# Patient Record
Sex: FEMALE | Race: WHITE | NOT HISPANIC OR LATINO | Employment: FULL TIME | URBAN - METROPOLITAN AREA
[De-identification: names, ages, dates, MRNs, and addresses within clinical notes are randomized per-mention and may not be internally consistent; named-entity substitution may affect disease eponyms.]

---

## 2024-04-05 ENCOUNTER — TELEPHONE (OUTPATIENT)
Dept: PRIMARY CARE | Facility: CLINIC | Age: 49
End: 2024-04-05
Payer: COMMERCIAL

## 2024-04-05 NOTE — TELEPHONE ENCOUNTER
It shouldn't be an issue for Dr. Mcdonough to complete it however it won't be done until this coming Monday

## 2024-04-05 NOTE — TELEPHONE ENCOUNTER
LV 6/14/22, 4/15/24    Pt needs a note stating that she can return to work on Tuesday, started covid symptoms on Monday, tested positive with covid Wednesday morning.  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

## 2024-04-08 PROBLEM — E78.00 PURE HYPERCHOLESTEROLEMIA: Status: ACTIVE | Noted: 2024-04-08

## 2024-04-08 PROBLEM — R73.01 IMPAIRED FASTING GLUCOSE: Status: ACTIVE | Noted: 2024-04-08

## 2024-04-08 PROBLEM — J30.2 SEASONAL ALLERGIC RHINITIS: Status: ACTIVE | Noted: 2024-04-08

## 2024-04-08 PROBLEM — L30.9 ECZEMA: Status: ACTIVE | Noted: 2024-04-08

## 2024-04-08 PROBLEM — G47.33 OBSTRUCTIVE SLEEP APNEA SYNDROME: Status: ACTIVE | Noted: 2024-04-08

## 2024-04-08 PROBLEM — F41.9 ANXIETY: Status: ACTIVE | Noted: 2024-04-08

## 2024-04-08 PROBLEM — J45.909 ASTHMA (HHS-HCC): Status: ACTIVE | Noted: 2024-04-08

## 2024-04-08 RX ORDER — LEVOCETIRIZINE DIHYDROCHLORIDE 5 MG/1
TABLET, FILM COATED ORAL EVERY 24 HOURS
COMMUNITY

## 2024-04-08 RX ORDER — FLUTICASONE PROPIONATE 50 MCG
1 SPRAY, SUSPENSION (ML) NASAL AS NEEDED
COMMUNITY

## 2024-04-08 RX ORDER — ONDANSETRON 4 MG/1
TABLET, FILM COATED ORAL
COMMUNITY
Start: 2022-06-14 | End: 2024-04-15 | Stop reason: ALTCHOICE

## 2024-04-08 RX ORDER — LEVONORGESTREL 52 MG/1
INTRAUTERINE DEVICE INTRAUTERINE
COMMUNITY

## 2024-04-08 RX ORDER — TRIAMCINOLONE ACETONIDE 5 MG/G
1 CREAM TOPICAL
COMMUNITY
Start: 2021-06-28 | End: 2024-04-15 | Stop reason: ALTCHOICE

## 2024-04-08 RX ORDER — FAMOTIDINE 20 MG/1
20 TABLET, FILM COATED ORAL AS NEEDED
COMMUNITY

## 2024-04-08 RX ORDER — CITALOPRAM 20 MG/1
20 TABLET, FILM COATED ORAL DAILY
COMMUNITY
End: 2024-04-29 | Stop reason: SDUPTHER

## 2024-04-08 RX ORDER — NIRMATRELVIR AND RITONAVIR 150-100 MG
KIT ORAL
COMMUNITY
Start: 2022-06-29 | End: 2024-04-15 | Stop reason: ALTCHOICE

## 2024-04-15 ENCOUNTER — OFFICE VISIT (OUTPATIENT)
Dept: PRIMARY CARE | Facility: CLINIC | Age: 49
End: 2024-04-15
Payer: COMMERCIAL

## 2024-04-15 VITALS
HEIGHT: 59 IN | OXYGEN SATURATION: 97 % | TEMPERATURE: 97.5 F | WEIGHT: 201 LBS | DIASTOLIC BLOOD PRESSURE: 86 MMHG | SYSTOLIC BLOOD PRESSURE: 118 MMHG | HEART RATE: 72 BPM | BODY MASS INDEX: 40.52 KG/M2

## 2024-04-15 DIAGNOSIS — R73.01 IMPAIRED FASTING GLUCOSE: ICD-10-CM

## 2024-04-15 DIAGNOSIS — F41.9 ANXIETY: ICD-10-CM

## 2024-04-15 DIAGNOSIS — J30.2 SEASONAL ALLERGIC RHINITIS, UNSPECIFIED TRIGGER: ICD-10-CM

## 2024-04-15 DIAGNOSIS — E78.00 PURE HYPERCHOLESTEROLEMIA: ICD-10-CM

## 2024-04-15 DIAGNOSIS — Z00.00 ANNUAL PHYSICAL EXAM: Primary | ICD-10-CM

## 2024-04-15 DIAGNOSIS — E55.9 VITAMIN D DEFICIENCY: ICD-10-CM

## 2024-04-15 DIAGNOSIS — K21.9 GASTROESOPHAGEAL REFLUX DISEASE WITHOUT ESOPHAGITIS: ICD-10-CM

## 2024-04-15 DIAGNOSIS — Z12.11 COLON CANCER SCREENING: ICD-10-CM

## 2024-04-15 DIAGNOSIS — Z12.31 ENCOUNTER FOR SCREENING MAMMOGRAM FOR BREAST CANCER: ICD-10-CM

## 2024-04-15 DIAGNOSIS — Z01.89 ENCOUNTER FOR ROUTINE LABORATORY TESTING: ICD-10-CM

## 2024-04-15 DIAGNOSIS — J45.20 MILD INTERMITTENT ASTHMA, UNSPECIFIED WHETHER COMPLICATED (HHS-HCC): ICD-10-CM

## 2024-04-15 DIAGNOSIS — Z11.59 NEED FOR HEPATITIS C SCREENING TEST: ICD-10-CM

## 2024-04-15 DIAGNOSIS — Z13.6 ENCOUNTER FOR SCREENING FOR CARDIOVASCULAR DISORDERS: ICD-10-CM

## 2024-04-15 DIAGNOSIS — R53.83 FATIGUE, UNSPECIFIED TYPE: ICD-10-CM

## 2024-04-15 PROCEDURE — 99396 PREV VISIT EST AGE 40-64: CPT | Performed by: NURSE PRACTITIONER

## 2024-04-15 PROCEDURE — 1036F TOBACCO NON-USER: CPT | Performed by: NURSE PRACTITIONER

## 2024-04-15 RX ORDER — ALBUTEROL SULFATE 90 UG/1
2 AEROSOL, METERED RESPIRATORY (INHALATION) EVERY 4 HOURS PRN
Qty: 8.5 G | Refills: 0 | Status: SHIPPED | OUTPATIENT
Start: 2024-04-15 | End: 2025-04-15

## 2024-04-15 RX ORDER — CHOLECALCIFEROL (VITAMIN D3) 25 MCG
4000 TABLET ORAL DAILY
COMMUNITY

## 2024-04-15 ASSESSMENT — LIFESTYLE VARIABLES
AUDIT-C TOTAL SCORE: 0
HOW OFTEN DO YOU HAVE A DRINK CONTAINING ALCOHOL: NEVER
HOW OFTEN DURING THE LAST YEAR HAVE YOU NEEDED AN ALCOHOLIC DRINK FIRST THING IN THE MORNING TO GET YOURSELF GOING AFTER A NIGHT OF HEAVY DRINKING: NEVER
HAS A RELATIVE, FRIEND, DOCTOR, OR ANOTHER HEALTH PROFESSIONAL EXPRESSED CONCERN ABOUT YOUR DRINKING OR SUGGESTED YOU CUT DOWN: NO
HOW OFTEN DURING THE LAST YEAR HAVE YOU BEEN UNABLE TO REMEMBER WHAT HAPPENED THE NIGHT BEFORE BECAUSE YOU HAD BEEN DRINKING: NEVER
HAVE YOU OR SOMEONE ELSE BEEN INJURED AS A RESULT OF YOUR DRINKING: NO
HOW OFTEN DURING THE LAST YEAR HAVE YOU FAILED TO DO WHAT WAS NORMALLY EXPECTED FROM YOU BECAUSE OF DRINKING: NEVER
AUDIT TOTAL SCORE: 0
HOW OFTEN DURING THE LAST YEAR HAVE YOU FOUND THAT YOU WERE NOT ABLE TO STOP DRINKING ONCE YOU HAD STARTED: NEVER
HOW OFTEN DO YOU HAVE SIX OR MORE DRINKS ON ONE OCCASION: NEVER
SKIP TO QUESTIONS 9-10: 1
HOW MANY STANDARD DRINKS CONTAINING ALCOHOL DO YOU HAVE ON A TYPICAL DAY: PATIENT DOES NOT DRINK
HOW OFTEN DURING THE LAST YEAR HAVE YOU HAD A FEELING OF GUILT OR REMORSE AFTER DRINKING: NEVER

## 2024-04-15 ASSESSMENT — PATIENT HEALTH QUESTIONNAIRE - PHQ9
2. FEELING DOWN, DEPRESSED OR HOPELESS: NOT AT ALL
SUM OF ALL RESPONSES TO PHQ9 QUESTIONS 1 AND 2: 0
1. LITTLE INTEREST OR PLEASURE IN DOING THINGS: NOT AT ALL

## 2024-04-15 ASSESSMENT — ENCOUNTER SYMPTOMS
BACK PAIN: 0
ABDOMINAL PAIN: 0
PALPITATIONS: 0
DYSURIA: 0
WOUND: 0
LOSS OF SENSATION IN FEET: 0
SPEECH DIFFICULTY: 0
NAUSEA: 0
FEVER: 0
OCCASIONAL FEELINGS OF UNSTEADINESS: 0
VOMITING: 0
FLANK PAIN: 0
SHORTNESS OF BREATH: 0
CHEST TIGHTNESS: 0
CONFUSION: 0
CHILLS: 0
HEMATURIA: 0
DIZZINESS: 0
AGITATION: 0
BRUISES/BLEEDS EASILY: 0
ADENOPATHY: 0
HEADACHES: 0
POLYDIPSIA: 0
DIAPHORESIS: 0
POLYPHAGIA: 0
COUGH: 0
DEPRESSION: 0
SEIZURES: 0
FATIGUE: 0
FACIAL ASYMMETRY: 0
BLOOD IN STOOL: 0
NECK PAIN: 0

## 2024-04-15 ASSESSMENT — PAIN SCALES - GENERAL: PAINLEVEL: 0-NO PAIN

## 2024-04-15 NOTE — PROGRESS NOTES
[facility]: [facility]  PROGRESS NOTE      Perlita Burnett is a 48 y.o. female that is presenting today to establish with PCP and CPE.    Assessment/Plan   Diagnoses and all orders for this visit:    Annual physical exam    Pure hypercholesterolemia  -     Comprehensive Metabolic Panel; Future  -     Lipid Panel; Future    Anxiety  -     Seems to be having more depression symptoms and mildly increased anxiety. Will increase dose and follow up in 2 weeks to check efficacy of medication adjustment.  -     Increase Celexa 20 me daily  -     TSH with reflex to Free T4 if abnormal; Future    Impaired fasting glucose  -     Comprehensive Metabolic Panel; Future  -     Hemoglobin A1C; Future    Seasonal allergic rhinitis, unspecified trigger        -     Stable        -     Continue Xyzal 5 mg daily as needed    Vitamin D deficiency  -     Vitamin D 25-Hydroxy,Total (for eval of Vitamin D levels); Future  -     Continue daily OTC Vitamin D supplement    Mild intermittent asthma, unspecified whether complicated ([facility])  -     Rare use of inhaler  -     albuterol (ProAir HFA) 90 mcg/actuation inhaler; Inhale 2 puffs every 4 hours if needed for wheezing or shortness of breath.    Gastroesophageal reflux disease without esophagitis        -     Patient was taking H2 blocker 4-5x/wk. Advised to take daily        -     Increase Famotidine 20 mg to daily    Colon cancer screening  -     Referral to Gastroenterology; Future    Need for hepatitis C screening test  -     Hepatitis C antibody; Future    Fatigue, unspecified type  -     CBC and Auto Differential; Future  -     Comprehensive Metabolic Panel; Future  -     TSH with reflex to Free T4 if abnormal; Future    Encounter for routine laboratory testing  -     CBC and Auto Differential; Future  -     Comprehensive Metabolic Panel; Future  -     Lipid Panel; Future  -     Hemoglobin A1C; Future  -     Vitamin D 25-Hydroxy,Total (for eval of Vitamin  D levels); Future  -     TSH with reflex to Free T4 if abnormal; Future    Encounter for screening mammogram for breast cancer  -     BI mammo bilateral screening tomosynthesis; Future    Encounter for screening for cardiovascular disorders  -     CT cardiac scoring wo IV contrast; Future    Other orders  -     Follow Up In Primary Care - Established; Future  -     Follow Up In Primary Care - Health Maintenance; Future    Subjective   Subjective  Perlita Burnett is a 48 y.o. female who presents for follow up of anxiety disorder. Current symptoms: lack of motivation, increased anxiety. She denies current suicidal and homicidal ideation. She complains of the following side effects from the treatment: none.     Objective  There were vitals taken for this visit.   General:    alert and oriented, in no acute distress  Affect/Behavior:     full facial expressions, good grooming, good insight, normal perception, normal reasoning, normal speech pattern and content, and normal thought patterns    Assessment/Plan  Anxiety Disorder - worsening  Medications: Increase Celexa from 10 mg daily to 20 mg daily  Instructed patient to contact office or on-call physician promptly should condition worsen or any new symptoms appear and provided on-call telephone numbers. IF THE PATIENT HAS ANY SUICIDAL OR HOMICIDAL IDEATIONS, CALL THE OFFICE, DISCUSS WITH A SUPPORT MEMBER, OR GO TO THE ER IMMEDIATELY. Patient was agreeable with this plan.  Follow up: 2 weeks.    Subjective  Perlita Burnett is an 48 y.o. female who presents for evaluation of heartburn.  She denies cough, dysphagia, heartburn, hematemesis, midespigastric pain, and nausea. She denies dysphagia. She has not lost weight. She denies melena, hematochezia, hematemesis, and coffee ground emesis.  Objective  [unfilled]     Assessment/Plan  Gastroesophageal Reflux Disease,  stable  Nonpharmacologic treatments were discussed including: eating smaller meals, elevation of the  head of bed at night, avoidance of caffeine, chocolate, nicotine and peppermint, and avoiding tight fitting clothing.  Will continue Famotidine 20 mg daily as needed  Follow up in 1 year or sooner as needed.    Patient reports rare use of inhaler, only with URI.        Review of Systems   Constitutional:  Negative for chills, diaphoresis, fatigue and fever.   HENT:  Negative for hearing loss and mouth sores.    Eyes:  Negative for visual disturbance.   Respiratory:  Negative for cough, chest tightness and shortness of breath.    Cardiovascular:  Negative for chest pain, palpitations and leg swelling.   Gastrointestinal:  Negative for abdominal pain, blood in stool, nausea and vomiting.   Endocrine: Negative for cold intolerance, heat intolerance, polydipsia, polyphagia and polyuria.   Genitourinary:  Negative for dysuria, flank pain and hematuria.   Musculoskeletal:  Negative for back pain and neck pain.   Skin:  Negative for rash and wound.   Allergic/Immunologic: Positive for environmental allergies and food allergies. Negative for immunocompromised state.   Neurological:  Negative for dizziness, seizures, syncope, facial asymmetry, speech difficulty and headaches.   Hematological:  Negative for adenopathy. Does not bruise/bleed easily.   Psychiatric/Behavioral:  Negative for agitation and confusion.       Objective   Vitals:    04/15/24 0854   BP: 118/86   Pulse: 72   Temp: 36.4 °C (97.5 °F)   SpO2: 97%      Body mass index is 40.6 kg/m².  Physical Exam  Vitals and nursing note reviewed.   Constitutional:       General: She is not in acute distress.     Appearance: Normal appearance. She is not ill-appearing.   HENT:      Head: Normocephalic and atraumatic.      Right Ear: Tympanic membrane, ear canal and external ear normal. There is no impacted cerumen.      Left Ear: Tympanic membrane, ear canal and external ear normal. There is no impacted cerumen.      Nose: Nose normal.      Mouth/Throat:      Mouth: Mucous  "membranes are moist.      Pharynx: Oropharynx is clear. No oropharyngeal exudate or posterior oropharyngeal erythema.   Eyes:      General: No scleral icterus.        Right eye: No discharge.         Left eye: No discharge.      Extraocular Movements: Extraocular movements intact.      Conjunctiva/sclera: Conjunctivae normal.      Pupils: Pupils are equal, round, and reactive to light.   Neck:      Vascular: No carotid bruit.   Cardiovascular:      Rate and Rhythm: Normal rate and regular rhythm.      Pulses: Normal pulses.      Heart sounds: Normal heart sounds. No murmur heard.  Pulmonary:      Effort: Pulmonary effort is normal. No respiratory distress.      Breath sounds: Normal breath sounds.   Abdominal:      General: Abdomen is flat. Bowel sounds are normal. There is no distension.      Palpations: Abdomen is soft. There is no mass.      Tenderness: There is no abdominal tenderness. There is no right CVA tenderness or left CVA tenderness.      Hernia: No hernia is present.   Musculoskeletal:         General: No tenderness. Normal range of motion.      Cervical back: No tenderness.      Right lower leg: No edema.      Left lower leg: No edema.   Lymphadenopathy:      Cervical: No cervical adenopathy.   Skin:     General: Skin is warm and dry.      Coloration: Skin is not jaundiced.      Findings: No rash.   Neurological:      General: No focal deficit present.      Mental Status: She is alert and oriented to person, place, and time. Mental status is at baseline.   Psychiatric:         Mood and Affect: Mood normal.         Behavior: Behavior normal.       Diagnostic Results   No results found for: \"GLUCOSE\", \"CALCIUM\", \"NA\", \"K\", \"CO2\", \"CL\", \"BUN\", \"CREATININE\"  No results found for: \"ALT\", \"AST\", \"GGT\", \"ALKPHOS\", \"BILITOT\"  No results found for: \"WBC\", \"HGB\", \"HCT\", \"MCV\", \"PLT\"  No results found for: \"CHOL\"  No results found for: \"HDL\"  No results found for: \"LDLCALC\"  No results found for: \"TRIG\"  No " "components found for: \"CHOLHDL\"  No results found for: \"HGBA1C\"  Other labs not included in the list above were reviewed either before or during this encounter.    History    Past Medical History:   Diagnosis Date    Pain in limb 05/14/2009     History reviewed. No pertinent surgical history.  No family history on file.  Social History     Socioeconomic History    Marital status:      Spouse name: Not on file    Number of children: Not on file    Years of education: Not on file    Highest education level: Not on file   Occupational History    Not on file   Tobacco Use    Smoking status: Never     Passive exposure: Never    Smokeless tobacco: Never   Substance and Sexual Activity    Alcohol use: Never    Drug use: Never    Sexual activity: Not on file   Other Topics Concern    Not on file   Social History Narrative    Not on file     Social Determinants of Health     Financial Resource Strain: Not on file   Food Insecurity: Not on file   Transportation Needs: Not on file   Physical Activity: Not on file   Stress: Not on file   Social Connections: Not on file   Intimate Partner Violence: Not on file   Housing Stability: Not on file     Allergies   Allergen Reactions    Iodine Hives    Banana Unknown     lips tingle    Shellfish Derived Hives and Unknown     Current Outpatient Medications on File Prior to Visit   Medication Sig Dispense Refill    cholecalciferol (Vitamin D3) 25 MCG (1000 UT) tablet Take 1 tablet (1,000 Units) by mouth once daily.      citalopram (CeleXA) 10 mg tablet Take 1 tablet (10 mg) by mouth once daily.      famotidine (Pepcid) 20 mg tablet Take 1 tablet (20 mg) by mouth if needed.      fluticasone (Flonase Allergy Relief) 50 mcg/actuation nasal spray Administer 1 spray into each nostril if needed.      levocetirizine (Xyzal) 5 mg tablet once every 24 hours.      levonorgestrel (Mirena) 21 mcg/24 hours (8 yrs) 52 mg IUD Mirena      [DISCONTINUED] albuterol 108 (90 Base) MCG/ACT inhaler 2 " puffs Inhaler every 4-6 hours prn      [DISCONTINUED] mv-mn/iron/folic acid/herb 190 (VITAMIN D3 COMPLETE ORAL) 1000 units plain is vit d3 is ok orally daily with food      [DISCONTINUED] nirmatrelvir-ritonavir (Paxlovid) 150-100 mg tablet therapy pack Morning Dose: all 3 tabs together Orally Evening Dose: All 3 tabs together for 5 days      [DISCONTINUED] ondansetron (Zofran) 4 mg tablet 1 tablet odt Orally every 8 hrs as needed for nausea for 30 day(s)      [DISCONTINUED] triamcinolone (Kenalog) 0.5 % cream 1 Application.       No current facility-administered medications on file prior to visit.     Immunization History   Administered Date(s) Administered    Influenza, Unspecified 11/16/2006, 11/03/2009    Influenza, injectable, MDCK, quadrivalent 11/06/2017    Influenza, injectable, quadrivalent 11/01/2019    Novel Influenza-H1N1-09, all formulations 11/06/2009    Pfizer Purple Cap SARS-CoV-2 01/04/2021, 01/25/2021, 10/23/2021    Pneumococcal polysaccharide vaccine, 23-valent, age 2 years and older (PNEUMOVAX 23) 10/26/2010    Tdap vaccine, age 7 year and older (BOOSTRIX, ADACEL) 02/25/2010, 10/26/2010, 06/29/2020     Patient's medical history was reviewed and updated either before or during this encounter.       Sarah Lebron, APRN-CNP

## 2024-04-19 LAB
CALCIDIOL (25 OH VITAMIN D3) (NG/ML) IN SER/PLAS EXTERNAL: 27 NG/ML
CHOLESTEROL (MG/DL) IN SER/PLAS EXTERNAL: 214 MG/DL
CHOLESTEROL IN HDL (MG/DL) IN SER/PLAS EXTERNAL: 44 MG/DL
CHOLESTEROL IN LDL (MG/DL) IN SERUM OR PLASMA BY CALCULATION EXTERNAL: 138 MG/DL
CHOLESTEROL/HDL RATIO EXTERNAL: 4.9
HEMATOCRIT (%) IN BLOOD BY AUTOMATED COUNT EXTERNAL: 44.4 %
HEMOGLOBIN (G/DL) IN BLOOD EXTERNAL: 14.8 G/DL
HEMOGLOBIN A1C/HEMOGLOBIN TOTAL IN BLOOD EXTERNAL: 5.9 %
HEPATITIS C VIRUS AB PRESENCE IN SERUM EXTERNAL: NONREACTIVE
LEUKOCYTES (10*3/UL) IN BLOOD BY AUTOMATED COUNT EXTERNAL: 5.2 X10*3/UL
NON HDL CHOLESTEROL EXTERNAL: 170 MG/DL
NRBC (PER 100 WBCS) BY AUTOMATED COUNT EXTERNAL: 4.8 /100 WBCS
PLATELET MEAN VOLUME (FL) IN BLOOD BY AUTOMATED COUNT EXTERNAL: 91.7 FL
PLATELETS (10*3/UL) IN BLOOD AUTOMATED COUNT EXTERNAL: 305 X10*3/UL
THYROTROPIN (MIU/L) IN SER/PLAS BY DETECTION LIMIT <= 0.05 MIU/L EXTERNAL: 3 MIU/L
TRIGLYCERIDE (MG/DL) IN SER/PLAS EXTERNAL: 183 MG/DL

## 2024-04-22 ENCOUNTER — APPOINTMENT (OUTPATIENT)
Dept: RADIOLOGY | Facility: CLINIC | Age: 49
End: 2024-04-22
Payer: COMMERCIAL

## 2024-04-22 LAB
ALANINE AMINOTRANSFERASE (SGPT) (U/L) IN SER/PLAS EXTERNAL: 59 U/L
ALBUMIN (G/DL) IN SER/PLAS EXTERNAL: 4.1 G/DL
ALKALINE PHOSPHATASE (U/L) IN SER/PLAS EXTERNAL: 59 U/L
ASPARTATE AMINOTRANSFERASE (SGOT) (U/L) IN SER/PLAS EXTERNAL: 72 U/L
BILIRUBIN TOTAL (MG/DL) IN SER/PLAS EXTERNAL: 0.6 MG/DL
CALCIUM (MG/DL) IN SER/PLAS EXTERNAL: 9.5 MG/DL
CARBON DIOXIDE, TOTAL (MMOL/L) IN SER/PLAS EXTERNAL: 27 MMOL/L
CHLORIDE (MMOL/L) IN SER/PLAS EXTERNAL: 102 MMOL/L
CREATININE (MG/DL) IN SER/PLAS EXTERNAL: 0.58 MG/DL
GLOMERULAR FILTRATION RATE ML/MIN/1.73 SQ M.PREDICTED EXTERNAL: 113 ML/MIN/1.73M*2
GLUCOSE (MG/DL) IN SER/PLAS EXTERNAL: 108 MG/DL
POTASSIUM (MMOL/L) IN SER/PLAS EXTERMA;: 4.3 MMOL/L
PROTEIN TOTAL EXTERNAL: 6.7 G/DL
SODIUM (MMOL/L) IN SER/PLAS EXTERNAL: 136 MMOL/L
UREA NITROGEN (MG/DL) IN SER/PLAS EXTERNAL: 12 MG/DL

## 2024-04-26 ENCOUNTER — HOSPITAL ENCOUNTER (OUTPATIENT)
Dept: RADIOLOGY | Facility: CLINIC | Age: 49
Discharge: HOME | End: 2024-04-26
Payer: COMMERCIAL

## 2024-04-26 VITALS — BODY MASS INDEX: 40.32 KG/M2 | WEIGHT: 200 LBS | HEIGHT: 59 IN

## 2024-04-26 DIAGNOSIS — Z12.31 ENCOUNTER FOR SCREENING MAMMOGRAM FOR BREAST CANCER: ICD-10-CM

## 2024-04-26 PROBLEM — K21.9 GASTROESOPHAGEAL REFLUX DISEASE WITHOUT ESOPHAGITIS: Status: RESOLVED | Noted: 2024-04-26 | Resolved: 2024-04-26

## 2024-04-26 PROBLEM — J30.9 ALLERGIC RHINITIS: Status: RESOLVED | Noted: 2024-04-26 | Resolved: 2024-04-26

## 2024-04-26 PROBLEM — R53.83 FATIGUE: Status: RESOLVED | Noted: 2024-04-26 | Resolved: 2024-04-26

## 2024-04-26 PROCEDURE — 77063 BREAST TOMOSYNTHESIS BI: CPT | Performed by: STUDENT IN AN ORGANIZED HEALTH CARE EDUCATION/TRAINING PROGRAM

## 2024-04-26 PROCEDURE — 77067 SCR MAMMO BI INCL CAD: CPT

## 2024-04-26 PROCEDURE — 77067 SCR MAMMO BI INCL CAD: CPT | Performed by: STUDENT IN AN ORGANIZED HEALTH CARE EDUCATION/TRAINING PROGRAM

## 2024-04-29 ENCOUNTER — OFFICE VISIT (OUTPATIENT)
Dept: PRIMARY CARE | Facility: CLINIC | Age: 49
End: 2024-04-29
Payer: COMMERCIAL

## 2024-04-29 VITALS
OXYGEN SATURATION: 98 % | HEART RATE: 77 BPM | SYSTOLIC BLOOD PRESSURE: 124 MMHG | TEMPERATURE: 97.2 F | WEIGHT: 201 LBS | DIASTOLIC BLOOD PRESSURE: 74 MMHG | HEIGHT: 59 IN | BODY MASS INDEX: 40.52 KG/M2

## 2024-04-29 DIAGNOSIS — F41.8 MIXED ANXIETY AND DEPRESSIVE DISORDER: Primary | ICD-10-CM

## 2024-04-29 PROCEDURE — 99213 OFFICE O/P EST LOW 20 MIN: CPT | Performed by: NURSE PRACTITIONER

## 2024-04-29 PROCEDURE — 1036F TOBACCO NON-USER: CPT | Performed by: NURSE PRACTITIONER

## 2024-04-29 RX ORDER — CITALOPRAM 20 MG/1
20 TABLET, FILM COATED ORAL DAILY
Qty: 90 TABLET | Refills: 3 | Status: SHIPPED | OUTPATIENT
Start: 2024-04-29

## 2024-04-29 ASSESSMENT — ENCOUNTER SYMPTOMS
LOSS OF SENSATION IN FEET: 0
FATIGUE: 0
DIAPHORESIS: 0
OCCASIONAL FEELINGS OF UNSTEADINESS: 0
DYSPHORIC MOOD: 0
PALPITATIONS: 0
NERVOUS/ANXIOUS: 0
VOMITING: 0
CHILLS: 0
DEPRESSION: 0
FEVER: 0
NAUSEA: 0

## 2024-04-29 ASSESSMENT — PATIENT HEALTH QUESTIONNAIRE - PHQ9
2. FEELING DOWN, DEPRESSED OR HOPELESS: NOT AT ALL
1. LITTLE INTEREST OR PLEASURE IN DOING THINGS: NOT AT ALL
SUM OF ALL RESPONSES TO PHQ9 QUESTIONS 1 AND 2: 0

## 2024-04-29 ASSESSMENT — PAIN SCALES - GENERAL: PAINLEVEL: 1

## 2024-04-29 NOTE — PROGRESS NOTES
[facility]: [facility]  PROGRESS NOTE      Perlita Burnett is a 48 y.o. female that is presenting today for Follow-up.    Patient seen as NP and CPE 04/15/24.  Her depression and anxiety was noted to be worsening and Celexa was increased to 20 mg daily. Patient presents today to assess efficacy of medication adjustment.  Patient reports she has more motivation and improved depression and anxiety levels.  She wishes to continue at current dose.    Assessment/Plan   Diagnoses and all orders for this visit:    Mixed anxiety and depressive disorder  -     Doing well on increased dose  -     Continue citalopram (CeleXA) 20 mg tablet; Take 1 tablet (20 mg) by mouth once daily.    Other orders  -     Follow Up In Primary Care - Established    Subjective   HPI  Review of Systems   Constitutional:  Negative for chills, diaphoresis, fatigue and fever.   Cardiovascular:  Negative for chest pain and palpitations.   Gastrointestinal:  Negative for nausea and vomiting.   Psychiatric/Behavioral:  Negative for dysphoric mood. The patient is not nervous/anxious.       Objective   Vitals:    04/29/24 1522   BP: 124/74   Pulse: 77   Temp: 36.2 °C (97.2 °F)   SpO2: 98%      Body mass index is 40.6 kg/m².  Physical Exam  Constitutional:       General: She is not in acute distress.     Appearance: She is not ill-appearing.   Cardiovascular:      Rate and Rhythm: Normal rate and regular rhythm.      Heart sounds: Normal heart sounds.   Pulmonary:      Effort: Pulmonary effort is normal.      Breath sounds: Normal breath sounds.   Skin:     General: Skin is warm and dry.   Neurological:      General: No focal deficit present.      Mental Status: She is alert. Mental status is at baseline.   Psychiatric:         Mood and Affect: Mood normal.         Behavior: Behavior normal.         Thought Content: Thought content normal.       Diagnostic Results   Lab Results   Component Value Date    GLUCOSE 108 04/19/2024     "CALCIUM 9.5 04/19/2024     04/19/2024    K 4.3 04/19/2024    CO2 27 04/19/2024     04/19/2024    BUN 12 04/19/2024    CREATININE 0.58 04/19/2024     Lab Results   Component Value Date    ALT 59 04/19/2024    AST 72 04/19/2024    ALKPHOS 59 04/19/2024    BILITOT 0.6 04/19/2024     Lab Results   Component Value Date    WBC 5.2 04/19/2024    HGB 14.8 04/19/2024    HCT 44.4 04/19/2024     04/19/2024     Lab Results   Component Value Date    CHOL 214 04/19/2024     Lab Results   Component Value Date    HDL 44.0 04/19/2024     Lab Results   Component Value Date    LDLCALC 138 04/19/2024     Lab Results   Component Value Date    TRIG 183 04/19/2024     No components found for: \"CHOLHDL\"  Lab Results   Component Value Date    HGBA1C 5.9 04/19/2024     Other labs not included in the list above were reviewed either before or during this encounter.    History    Past Medical History:   Diagnosis Date    Allergic rhinitis 04/26/2024    Fatigue 04/26/2024    Gastroesophageal reflux disease without esophagitis 04/26/2024    Pain in limb 05/14/2009     History reviewed. No pertinent surgical history.  Family History   Problem Relation Name Age of Onset    Breast cancer Father's Sister  45     Social History     Socioeconomic History    Marital status:      Spouse name: Not on file    Number of children: Not on file    Years of education: Not on file    Highest education level: Not on file   Occupational History    Not on file   Tobacco Use    Smoking status: Never     Passive exposure: Never    Smokeless tobacco: Never   Vaping Use    Vaping status: Never Used   Substance and Sexual Activity    Alcohol use: Never    Drug use: Never    Sexual activity: Not on file   Other Topics Concern    Not on file   Social History Narrative    Not on file     Social Determinants of Health     Financial Resource Strain: Not on file   Food Insecurity: Not on file   Transportation Needs: Not on file   Physical Activity: " Not on file   Stress: Not on file   Social Connections: Not on file   Intimate Partner Violence: Not on file   Housing Stability: Not on file     Allergies   Allergen Reactions    Iodine Hives    Banana Unknown     lips tingle    Shellfish Derived Hives and Unknown     Current Outpatient Medications on File Prior to Visit   Medication Sig Dispense Refill    albuterol (ProAir HFA) 90 mcg/actuation inhaler Inhale 2 puffs every 4 hours if needed for wheezing or shortness of breath. 8.5 g 0    cholecalciferol (Vitamin D3) 25 MCG (1000 UT) tablet Take 4 tablets (4,000 Units) by mouth once daily.      citalopram (CeleXA) 20 mg tablet Take 1 tablet (20 mg) by mouth once daily.      famotidine (Pepcid) 20 mg tablet Take 1 tablet (20 mg) by mouth if needed.      fluticasone (Flonase Allergy Relief) 50 mcg/actuation nasal spray Administer 1 spray into each nostril if needed.      levocetirizine (Xyzal) 5 mg tablet once every 24 hours.      levonorgestrel (Mirena) 21 mcg/24 hours (8 yrs) 52 mg IUD Mirena       No current facility-administered medications on file prior to visit.     Immunization History   Administered Date(s) Administered    Influenza, Unspecified 11/16/2006, 11/03/2009    Influenza, injectable, MDCK, quadrivalent 11/06/2017    Influenza, injectable, quadrivalent 11/01/2019    Novel Influenza-H1N1-09, all formulations 11/06/2009    Pfizer Purple Cap SARS-CoV-2 01/04/2021, 01/25/2021, 10/23/2021    Pneumococcal polysaccharide vaccine, 23-valent, age 2 years and older (PNEUMOVAX 23) 10/26/2010    Tdap vaccine, age 7 year and older (BOOSTRIX, ADACEL) 02/25/2010, 10/26/2010, 06/29/2020     Patient's medical history was reviewed and updated either before or during this encounter.       Sarah Lebron, APRN-CNP

## 2024-05-13 ENCOUNTER — HOSPITAL ENCOUNTER (OUTPATIENT)
Dept: RADIOLOGY | Facility: HOSPITAL | Age: 49
Discharge: HOME | End: 2024-05-13
Payer: COMMERCIAL

## 2024-05-13 DIAGNOSIS — Z13.6 ENCOUNTER FOR SCREENING FOR CARDIOVASCULAR DISORDERS: ICD-10-CM

## 2024-05-13 DIAGNOSIS — F41.9 ANXIETY DISORDER, UNSPECIFIED: ICD-10-CM

## 2024-05-13 PROCEDURE — 75571 CT HRT W/O DYE W/CA TEST: CPT

## 2024-05-13 RX ORDER — CITALOPRAM 10 MG/1
10 TABLET ORAL DAILY
Qty: 90 TABLET | Refills: 2 | OUTPATIENT
Start: 2024-05-13

## 2024-08-19 DIAGNOSIS — J45.20 MILD INTERMITTENT ASTHMA, UNSPECIFIED WHETHER COMPLICATED (HHS-HCC): ICD-10-CM

## 2024-08-20 RX ORDER — ALBUTEROL SULFATE 90 UG/1
2 INHALANT RESPIRATORY (INHALATION) EVERY 4 HOURS PRN
Qty: 8.5 G | Refills: 3 | Status: SHIPPED | OUTPATIENT
Start: 2024-08-20 | End: 2025-08-20

## 2025-01-09 DIAGNOSIS — F41.8 MIXED ANXIETY AND DEPRESSIVE DISORDER: ICD-10-CM

## 2025-01-09 RX ORDER — CITALOPRAM 20 MG/1
30 TABLET, FILM COATED ORAL DAILY
Qty: 45 TABLET | Refills: 0 | Status: SHIPPED | OUTPATIENT
Start: 2025-01-09

## 2025-02-03 DIAGNOSIS — F41.8 MIXED ANXIETY AND DEPRESSIVE DISORDER: ICD-10-CM

## 2025-02-03 RX ORDER — CITALOPRAM 20 MG/1
30 TABLET, FILM COATED ORAL DAILY
Qty: 45 TABLET | Refills: 0 | Status: SHIPPED | OUTPATIENT
Start: 2025-02-03 | End: 2025-02-05 | Stop reason: SDUPTHER

## 2025-02-05 ENCOUNTER — OFFICE VISIT (OUTPATIENT)
Dept: PRIMARY CARE | Facility: CLINIC | Age: 50
End: 2025-02-05
Payer: COMMERCIAL

## 2025-02-05 VITALS
OXYGEN SATURATION: 97 % | TEMPERATURE: 97.6 F | BODY MASS INDEX: 39.72 KG/M2 | SYSTOLIC BLOOD PRESSURE: 110 MMHG | DIASTOLIC BLOOD PRESSURE: 74 MMHG | HEART RATE: 82 BPM | WEIGHT: 197 LBS | HEIGHT: 59 IN

## 2025-02-05 DIAGNOSIS — F41.8 MIXED ANXIETY AND DEPRESSIVE DISORDER: Primary | ICD-10-CM

## 2025-02-05 PROCEDURE — 3008F BODY MASS INDEX DOCD: CPT | Performed by: NURSE PRACTITIONER

## 2025-02-05 PROCEDURE — 1036F TOBACCO NON-USER: CPT | Performed by: NURSE PRACTITIONER

## 2025-02-05 PROCEDURE — 99213 OFFICE O/P EST LOW 20 MIN: CPT | Performed by: NURSE PRACTITIONER

## 2025-02-05 RX ORDER — DICLOFENAC SODIUM 10 MG/G
4 GEL TOPICAL AS NEEDED
COMMUNITY

## 2025-02-05 RX ORDER — TIRZEPATIDE 5 MG/.5ML
5 INJECTION, SOLUTION SUBCUTANEOUS
COMMUNITY

## 2025-02-05 RX ORDER — CITALOPRAM 40 MG/1
40 TABLET, FILM COATED ORAL DAILY
Qty: 90 TABLET | Refills: 3 | Status: SHIPPED | OUTPATIENT
Start: 2025-02-05

## 2025-02-05 ASSESSMENT — LIFESTYLE VARIABLES
HOW OFTEN DURING THE LAST YEAR HAVE YOU BEEN UNABLE TO REMEMBER WHAT HAPPENED THE NIGHT BEFORE BECAUSE YOU HAD BEEN DRINKING: NEVER
HAS A RELATIVE, FRIEND, DOCTOR, OR ANOTHER HEALTH PROFESSIONAL EXPRESSED CONCERN ABOUT YOUR DRINKING OR SUGGESTED YOU CUT DOWN: NO
HOW OFTEN DO YOU HAVE A DRINK CONTAINING ALCOHOL: NEVER
HOW OFTEN DURING THE LAST YEAR HAVE YOU FOUND THAT YOU WERE NOT ABLE TO STOP DRINKING ONCE YOU HAD STARTED: NEVER
HOW OFTEN DURING THE LAST YEAR HAVE YOU BEEN UNABLE TO REMEMBER WHAT HAPPENED THE NIGHT BEFORE BECAUSE YOU HAD BEEN DRINKING: NEVER
HOW OFTEN DURING THE LAST YEAR HAVE YOU FAILED TO DO WHAT WAS NORMALLY EXPECTED FROM YOU BECAUSE OF DRINKING: NEVER
HOW OFTEN DURING THE LAST YEAR HAVE YOU HAD A FEELING OF GUILT OR REMORSE AFTER DRINKING: NEVER
SKIP TO QUESTIONS 9-10: 1
AUDIT TOTAL SCORE: 2
HOW OFTEN DO YOU HAVE SIX OR MORE DRINKS ON ONE OCCASION: NEVER
HOW OFTEN DO YOU HAVE A DRINK CONTAINING ALCOHOL: 2-4 TIMES A MONTH
HOW OFTEN DURING THE LAST YEAR HAVE YOU NEEDED AN ALCOHOLIC DRINK FIRST THING IN THE MORNING TO GET YOURSELF GOING AFTER A NIGHT OF HEAVY DRINKING: NEVER
HAS A RELATIVE, FRIEND, DOCTOR, OR ANOTHER HEALTH PROFESSIONAL EXPRESSED CONCERN ABOUT YOUR DRINKING OR SUGGESTED YOU CUT DOWN: NO
AUDIT TOTAL SCORE: 0
HOW OFTEN DURING THE LAST YEAR HAVE YOU FAILED TO DO WHAT WAS NORMALLY EXPECTED FROM YOU BECAUSE OF DRINKING: NEVER
HOW OFTEN DURING THE LAST YEAR HAVE YOU NEEDED AN ALCOHOLIC DRINK FIRST THING IN THE MORNING TO GET YOURSELF GOING AFTER A NIGHT OF HEAVY DRINKING: NEVER
SKIP TO QUESTIONS 9-10: 1
AUDIT-C TOTAL SCORE: 2
HOW OFTEN DURING THE LAST YEAR HAVE YOU HAD A FEELING OF GUILT OR REMORSE AFTER DRINKING: NEVER
HOW OFTEN DURING THE LAST YEAR HAVE YOU FOUND THAT YOU WERE NOT ABLE TO STOP DRINKING ONCE YOU HAD STARTED: NEVER
HOW MANY STANDARD DRINKS CONTAINING ALCOHOL DO YOU HAVE ON A TYPICAL DAY: 1 OR 2
HAVE YOU OR SOMEONE ELSE BEEN INJURED AS A RESULT OF YOUR DRINKING: NO
HOW OFTEN DO YOU HAVE SIX OR MORE DRINKS ON ONE OCCASION: NEVER
HAVE YOU OR SOMEONE ELSE BEEN INJURED AS A RESULT OF YOUR DRINKING: NO
AUDIT-C TOTAL SCORE: 0
HOW MANY STANDARD DRINKS CONTAINING ALCOHOL DO YOU HAVE ON A TYPICAL DAY: PATIENT DOES NOT DRINK

## 2025-02-05 ASSESSMENT — ENCOUNTER SYMPTOMS
FATIGUE: 1
PALPITATIONS: 0
OCCASIONAL FEELINGS OF UNSTEADINESS: 0
NERVOUS/ANXIOUS: 0
NAUSEA: 0
VOMITING: 0
SHORTNESS OF BREATH: 0
DIAPHORESIS: 0
AGITATION: 0
CHILLS: 0
LOSS OF SENSATION IN FEET: 0
CONFUSION: 0
DYSPHORIC MOOD: 1
FEVER: 0
DEPRESSION: 0

## 2025-02-05 ASSESSMENT — PAIN SCALES - GENERAL: PAINLEVEL_OUTOF10: 0-NO PAIN

## 2025-02-05 ASSESSMENT — PATIENT HEALTH QUESTIONNAIRE - PHQ9
SUM OF ALL RESPONSES TO PHQ9 QUESTIONS 1 AND 2: 0
1. LITTLE INTEREST OR PLEASURE IN DOING THINGS: NOT AT ALL
2. FEELING DOWN, DEPRESSED OR HOPELESS: NOT AT ALL

## 2025-02-05 NOTE — PROGRESS NOTES
[facility]: [facility]  PROGRESS NOTE      Perlita Burnett is a 49 y.o. female that is presenting today for med check.    Ms. Burnett increased her citalopram form 20 mg to 30 mg 12/18/24. She reported that she seems to experience seasonal depression. She presents today for check of efficacy of dose adjustment.    Down, depressed, didn't want to go anywhere, housework lacking, weight gain, not as active. She has cut out caffeine which has helped anxiety. Has started tirzepatide to help with weight loss and is going to weight watchers. Feels better on 30 mg but still feels there is room for improvement.       Assessment/Plan   Diagnoses and all orders for this visit:    Mixed anxiety and depressive disorder  -     Increase citalopram (CeleXA) 40 mg tablet; Take 1 tablet (40 mg) by mouth once daily.  -     encouraged to continue weight loss efforts and regularly engaging in exercise.  -     CPE scheduled for 04/2025, will assess efficacy of medciation adjustment at that time.    Subjective   HPI  Review of Systems   Constitutional:  Positive for fatigue. Negative for chills, diaphoresis and fever.   Respiratory:  Negative for shortness of breath.    Cardiovascular:  Negative for chest pain and palpitations.   Gastrointestinal:  Negative for nausea and vomiting.   Psychiatric/Behavioral:  Positive for dysphoric mood. Negative for agitation and confusion. The patient is not nervous/anxious.       Objective   Vitals:    02/05/25 1128   BP: 110/74   Pulse: 82   Temp: 36.4 °C (97.6 °F)   SpO2: 97%      Body mass index is 39.79 kg/m².  Physical Exam  Constitutional:       General: She is not in acute distress.     Appearance: She is not ill-appearing.   Cardiovascular:      Rate and Rhythm: Normal rate and regular rhythm.   Pulmonary:      Effort: Pulmonary effort is normal.      Breath sounds: Normal breath sounds.   Skin:     General: Skin is warm and dry.   Neurological:      General: No  "focal deficit present.      Mental Status: She is alert. Mental status is at baseline.   Psychiatric:         Mood and Affect: Mood normal.         Behavior: Behavior normal.       Diagnostic Results   Lab Results   Component Value Date    GLUCOSE 108 04/19/2024    CALCIUM 9.5 04/19/2024     04/19/2024    K 4.3 04/19/2024    CO2 27 04/19/2024     04/19/2024    BUN 12 04/19/2024    CREATININE 0.58 04/19/2024     Lab Results   Component Value Date    ALT 59 04/19/2024    AST 72 04/19/2024    ALKPHOS 59 04/19/2024    BILITOT 0.6 04/19/2024     Lab Results   Component Value Date    WBC 5.2 04/19/2024    HGB 14.8 04/19/2024    HCT 44.4 04/19/2024     04/19/2024     Lab Results   Component Value Date    CHOL 214 04/19/2024     Lab Results   Component Value Date    HDL 44.0 04/19/2024     Lab Results   Component Value Date    LDLCALC 138 04/19/2024     Lab Results   Component Value Date    TRIG 183 04/19/2024     No components found for: \"CHOLHDL\"  Lab Results   Component Value Date    HGBA1C 5.9 04/19/2024     Other labs not included in the list above were reviewed either before or during this encounter.    History    Past Medical History:   Diagnosis Date    Allergic rhinitis 04/26/2024    Fatigue 04/26/2024    Gastroesophageal reflux disease without esophagitis 04/26/2024    Pain in limb 05/14/2009     History reviewed. No pertinent surgical history.  Family History   Problem Relation Name Age of Onset    Breast cancer Father's Sister  45     Social History     Socioeconomic History    Marital status:      Spouse name: Not on file    Number of children: Not on file    Years of education: Not on file    Highest education level: Not on file   Occupational History    Not on file   Tobacco Use    Smoking status: Never     Passive exposure: Never    Smokeless tobacco: Never   Vaping Use    Vaping status: Never Used   Substance and Sexual Activity    Alcohol use: Yes    Drug use: Never    Sexual " activity: Not on file   Other Topics Concern    Not on file   Social History Narrative    Not on file     Social Drivers of Health     Financial Resource Strain: Not on file   Food Insecurity: Not on file   Transportation Needs: Not on file   Physical Activity: Not on file   Stress: Not on file   Social Connections: Not on file   Intimate Partner Violence: Not on file   Housing Stability: Not on file     Allergies   Allergen Reactions    Iodine Hives    Banana Unknown     lips tingle    Shellfish Derived Hives and Unknown     Current Outpatient Medications on File Prior to Visit   Medication Sig Dispense Refill    albuterol 90 mcg/actuation inhaler INHALE 2 PUFFS EVERY 4 HOURS IF NEEDED FOR WHEEZING OR SHORTNESS OF BREATH. 8.5 g 3    cholecalciferol (Vitamin D3) 25 MCG (1000 UT) tablet Take 4 tablets (4,000 Units) by mouth once daily.      diclofenac sodium (Voltaren Arthritis Pain) 1 % gel Apply 4.5 inches (4 g) topically if needed.      famotidine (Pepcid) 20 mg tablet Take 1 tablet (20 mg) by mouth if needed.      fluticasone (Flonase Allergy Relief) 50 mcg/actuation nasal spray Administer 1 spray into each nostril if needed.      levocetirizine (Xyzal) 5 mg tablet once every 24 hours.      levonorgestrel (Mirena) 21 mcg/24 hours (8 yrs) 52 mg IUD Mirena      tirzepatide, weight loss, (Zepbound) 5 mg/0.5 mL solution Inject 5 mg under the skin every 7 days.      [DISCONTINUED] citalopram (CeleXA) 20 mg tablet Take 1.5 tablets (30 mg) by mouth once daily. 45 tablet 0    [DISCONTINUED] citalopram (CeleXA) 20 mg tablet Take 1.5 tablets (30 mg) by mouth once daily. 45 tablet 0     No current facility-administered medications on file prior to visit.     Immunization History   Administered Date(s) Administered    Influenza, Unspecified 11/16/2006, 11/03/2009    Influenza, injectable, MDCK, quadrivalent 11/06/2017    Influenza, injectable, quadrivalent 11/01/2019    Novel Influenza-H1N1-09, all formulations 11/06/2009     Pfizer Purple Cap SARS-CoV-2 01/04/2021, 01/25/2021, 10/23/2021    Pneumococcal polysaccharide vaccine, 23-valent, age 2 years and older (PNEUMOVAX 23) 10/26/2010    Tdap vaccine, age 7 year and older (BOOSTRIX, ADACEL) 02/25/2010, 10/26/2010, 06/29/2020     Patient's medical history was reviewed and updated either before or during this encounter.       Sarah Lebron, APRN-CNP

## 2025-04-15 ENCOUNTER — APPOINTMENT (OUTPATIENT)
Dept: PRIMARY CARE | Facility: CLINIC | Age: 50
End: 2025-04-15
Payer: COMMERCIAL

## 2025-05-06 DIAGNOSIS — F41.8 MIXED ANXIETY AND DEPRESSIVE DISORDER: ICD-10-CM

## 2025-05-06 RX ORDER — CITALOPRAM 20 MG/1
20 TABLET, FILM COATED ORAL DAILY
Qty: 90 TABLET | Refills: 3 | Status: SHIPPED | OUTPATIENT
Start: 2025-05-06

## 2025-07-21 ENCOUNTER — OFFICE VISIT (OUTPATIENT)
Dept: PRIMARY CARE | Facility: CLINIC | Age: 50
End: 2025-07-21
Payer: COMMERCIAL

## 2025-07-21 VITALS
TEMPERATURE: 96.9 F | BODY MASS INDEX: 36.29 KG/M2 | SYSTOLIC BLOOD PRESSURE: 130 MMHG | DIASTOLIC BLOOD PRESSURE: 88 MMHG | WEIGHT: 180 LBS | OXYGEN SATURATION: 98 % | HEIGHT: 59 IN | HEART RATE: 64 BPM

## 2025-07-21 DIAGNOSIS — K21.9 GASTROESOPHAGEAL REFLUX DISEASE WITHOUT ESOPHAGITIS: ICD-10-CM

## 2025-07-21 DIAGNOSIS — E66.812 CLASS 2 OBESITY WITHOUT SERIOUS COMORBIDITY WITH BODY MASS INDEX (BMI) OF 35.0 TO 35.9 IN ADULT, UNSPECIFIED OBESITY TYPE: ICD-10-CM

## 2025-07-21 DIAGNOSIS — J30.2 SEASONAL ALLERGIC RHINITIS, UNSPECIFIED TRIGGER: ICD-10-CM

## 2025-07-21 DIAGNOSIS — E55.9 VITAMIN D DEFICIENCY: ICD-10-CM

## 2025-07-21 DIAGNOSIS — Z12.11 ENCOUNTER FOR SCREENING FOR MALIGNANT NEOPLASM OF COLON: ICD-10-CM

## 2025-07-21 DIAGNOSIS — E78.2 MIXED HYPERLIPIDEMIA: ICD-10-CM

## 2025-07-21 DIAGNOSIS — Z12.31 ENCOUNTER FOR SCREENING MAMMOGRAM FOR BREAST CANCER: ICD-10-CM

## 2025-07-21 DIAGNOSIS — Z00.00 ANNUAL PHYSICAL EXAM: Primary | ICD-10-CM

## 2025-07-21 DIAGNOSIS — F41.8 MIXED ANXIETY AND DEPRESSIVE DISORDER: ICD-10-CM

## 2025-07-21 DIAGNOSIS — J45.20 MILD INTERMITTENT ASTHMA, UNSPECIFIED WHETHER COMPLICATED (HHS-HCC): ICD-10-CM

## 2025-07-21 PROCEDURE — 3008F BODY MASS INDEX DOCD: CPT | Performed by: NURSE PRACTITIONER

## 2025-07-21 PROCEDURE — 1036F TOBACCO NON-USER: CPT | Performed by: NURSE PRACTITIONER

## 2025-07-21 PROCEDURE — 99396 PREV VISIT EST AGE 40-64: CPT | Performed by: NURSE PRACTITIONER

## 2025-07-21 RX ORDER — TIRZEPATIDE 7.5 MG/.5ML
7.5 INJECTION, SOLUTION SUBCUTANEOUS
COMMUNITY

## 2025-07-21 RX ORDER — DOCUSATE SODIUM 100 MG/1
100 CAPSULE, LIQUID FILLED ORAL DAILY PRN
COMMUNITY

## 2025-07-21 ASSESSMENT — ENCOUNTER SYMPTOMS
DEPRESSION: 0
PALPITATIONS: 0
FLANK PAIN: 0
FATIGUE: 0
POLYDIPSIA: 0
VOMITING: 0
DIZZINESS: 0
DYSURIA: 0
COUGH: 0
FACIAL ASYMMETRY: 0
CHILLS: 0
SEIZURES: 0
DIAPHORESIS: 0
FEVER: 0
ADENOPATHY: 0
POLYPHAGIA: 0
SHORTNESS OF BREATH: 0
ABDOMINAL PAIN: 0
HEADACHES: 0
NECK PAIN: 0
BLOOD IN STOOL: 0
LOSS OF SENSATION IN FEET: 0
NAUSEA: 0
SPEECH DIFFICULTY: 0
AGITATION: 0
BACK PAIN: 0
BRUISES/BLEEDS EASILY: 0
HEMATURIA: 0
WOUND: 0
CONFUSION: 0
OCCASIONAL FEELINGS OF UNSTEADINESS: 0
CHEST TIGHTNESS: 0

## 2025-07-21 ASSESSMENT — LIFESTYLE VARIABLES
HOW OFTEN DURING THE LAST YEAR HAVE YOU FOUND THAT YOU WERE NOT ABLE TO STOP DRINKING ONCE YOU HAD STARTED: NEVER
HAVE YOU OR SOMEONE ELSE BEEN INJURED AS A RESULT OF YOUR DRINKING: NO
HOW OFTEN DURING THE LAST YEAR HAVE YOU HAD A FEELING OF GUILT OR REMORSE AFTER DRINKING: NEVER
HOW OFTEN DO YOU HAVE SIX OR MORE DRINKS ON ONE OCCASION: NEVER
AUDIT TOTAL SCORE: 2
HOW MANY STANDARD DRINKS CONTAINING ALCOHOL DO YOU HAVE ON A TYPICAL DAY: 1 OR 2
HOW OFTEN DURING THE LAST YEAR HAVE YOU BEEN UNABLE TO REMEMBER WHAT HAPPENED THE NIGHT BEFORE BECAUSE YOU HAD BEEN DRINKING: NEVER
HOW OFTEN DURING THE LAST YEAR HAVE YOU FAILED TO DO WHAT WAS NORMALLY EXPECTED FROM YOU BECAUSE OF DRINKING: NEVER
SKIP TO QUESTIONS 9-10: 1
HOW OFTEN DURING THE LAST YEAR HAVE YOU NEEDED AN ALCOHOLIC DRINK FIRST THING IN THE MORNING TO GET YOURSELF GOING AFTER A NIGHT OF HEAVY DRINKING: NEVER
HOW OFTEN DO YOU HAVE A DRINK CONTAINING ALCOHOL: 2-4 TIMES A MONTH
AUDIT-C TOTAL SCORE: 2
HAS A RELATIVE, FRIEND, DOCTOR, OR ANOTHER HEALTH PROFESSIONAL EXPRESSED CONCERN ABOUT YOUR DRINKING OR SUGGESTED YOU CUT DOWN: NO

## 2025-07-21 ASSESSMENT — PAIN SCALES - GENERAL: PAINLEVEL_OUTOF10: 0-NO PAIN

## 2025-07-21 NOTE — PROGRESS NOTES
[facility]: [facility]  PHYSICAL EXAM      Perlita Burnett is a 49 y.o. female that is presenting today for Annual Physical Exam.    Encouraged Covid 19 immunization via local pharmacy.    Encouraged follow up with OB/GYN for cervical cancer screening.    Due for breast and colon cancer screenings.    Ms. Burnett reports SSRI effective in managing her anxiety and depression. She is not followed by Psychiatry. She is not currently involved in either individual or group therapy.    Asthma is managed effectively with current medication regimen. She is not followed by Pulmonology. Denies chest tightness, chronic cough, wheezing or SOB.    GERD symptoms managed with H2 blocker. Denies epigastric pain, acid reflux, nausea or vomiting, change in bowel habits or blood in stool.    Seasonal allergies managed with OTC antihistamine and Flonase.    She is followed by weight loss specialist and using tirzepatide to aid her weight loss journey through [employer]. 30# weight loss so far!    Assessment/Plan     Diagnoses and all orders for this visit:    Annual physical exam        -     Routine and preventative care provided and discussed with patient.    Mixed anxiety and depressive disorder        -     Stable on SSRI        -     celexa 40 mg daily    Mild intermittent asthma, unspecified whether complicated ([facility])        -     Stable with use of rescue inhaler as needed        -     albuterol 90 mcg/actuation inhaler as needed          Seasonal allergic rhinitis, unspecified trigger        -     Stable        -     Flonase allergy relief 50 mcg/actuation inhaler as needed        -     Xyzal 5 mg daily as needed    Gastroesophageal reflux disease without esophagitis        -     Stable on H2 Blocker        -     famotidine 20 mg daily    Vitamin D deficiency  -     Vitamin D 25-Hydroxy,Total (for eval of Vitamin D levels); Future  -     continue daily OTC Vitamin D supplement    Mixed  hyperlipidemia  -     Comprehensive Metabolic Panel; Future  -     Lipid Panel; Future    Class 2 obesity without serious comorbidity with body mass index (BMI) of 35.0 to 35.9 in adult, unspecified obesity type  -     CBC and Auto Differential; Future  -     Comprehensive Metabolic Panel; Future    Encounter for screening mammogram for breast cancer  -     BI mammo bilateral screening tomosynthesis; Future    Encounter for screening for malignant neoplasm of colon  -     Referral to Gastroenterology; Future    Other orders  -     Follow Up In Primary Care - Health Maintenance  -     Follow Up In Primary Care - Health Maintenance; Future    Subjective   HPI  Review of Systems   Constitutional:  Negative for chills, diaphoresis, fatigue and fever.   HENT:  Negative for hearing loss and mouth sores.    Eyes:  Negative for visual disturbance.   Respiratory:  Negative for cough, chest tightness and shortness of breath.    Cardiovascular:  Negative for chest pain, palpitations and leg swelling.   Gastrointestinal:  Negative for abdominal pain, blood in stool, nausea and vomiting.   Endocrine: Negative for cold intolerance, heat intolerance, polydipsia, polyphagia and polyuria.   Genitourinary:  Negative for dysuria, flank pain and hematuria.   Musculoskeletal:  Negative for back pain and neck pain.   Skin:  Negative for rash and wound.   Allergic/Immunologic: Positive for environmental allergies (seasonal) and food allergies (banana, shellfish). Negative for immunocompromised state.   Neurological:  Negative for dizziness, seizures, syncope, facial asymmetry, speech difficulty and headaches.   Hematological:  Negative for adenopathy. Does not bruise/bleed easily.   Psychiatric/Behavioral:  Negative for agitation and confusion.       Objective   Vitals:    07/21/25 0823   BP: 130/88   Pulse: 64   Temp: 36.1 °C (96.9 °F)   SpO2: 98%     Body mass index is 36.36 kg/m².  Physical Exam  Vitals and nursing note reviewed.    Constitutional:       General: She is not in acute distress.     Appearance: Normal appearance. She is not ill-appearing.   HENT:      Head: Normocephalic and atraumatic.      Right Ear: Tympanic membrane, ear canal and external ear normal. There is no impacted cerumen.      Left Ear: Tympanic membrane, ear canal and external ear normal. There is no impacted cerumen.      Nose: Nose normal.      Mouth/Throat:      Mouth: Mucous membranes are moist.      Pharynx: Oropharynx is clear. No oropharyngeal exudate or posterior oropharyngeal erythema.     Eyes:      General: No scleral icterus.        Right eye: No discharge.         Left eye: No discharge.      Extraocular Movements: Extraocular movements intact.      Conjunctiva/sclera: Conjunctivae normal.      Pupils: Pupils are equal, round, and reactive to light.       Cardiovascular:      Rate and Rhythm: Normal rate and regular rhythm.      Pulses: Normal pulses.      Heart sounds: Normal heart sounds. No murmur heard.  Pulmonary:      Effort: Pulmonary effort is normal. No respiratory distress.      Breath sounds: Normal breath sounds.   Abdominal:      General: Abdomen is flat. Bowel sounds are normal. There is no distension.      Palpations: Abdomen is soft. There is no mass.      Tenderness: There is no abdominal tenderness. There is no right CVA tenderness or left CVA tenderness.     Musculoskeletal:         General: No tenderness. Normal range of motion.      Cervical back: No tenderness.      Right lower leg: No edema.      Left lower leg: No edema.   Lymphadenopathy:      Cervical: No cervical adenopathy.     Skin:     General: Skin is warm and dry.      Capillary Refill: Capillary refill takes less than 2 seconds.      Coloration: Skin is not jaundiced.      Findings: No rash.     Neurological:      General: No focal deficit present.      Mental Status: She is alert and oriented to person, place, and time. Mental status is at baseline.     Psychiatric:     "     Mood and Affect: Mood normal.         Behavior: Behavior normal.       Diagnostic Results   Lab Results   Component Value Date    GLUCOSE 108 04/19/2024    CALCIUM 9.5 04/19/2024     04/19/2024    K 4.3 04/19/2024    CO2 27 04/19/2024     04/19/2024    BUN 12 04/19/2024    CREATININE 0.58 04/19/2024     Lab Results   Component Value Date    ALT 59 04/19/2024    AST 72 04/19/2024    ALKPHOS 59 04/19/2024    BILITOT 0.6 04/19/2024     Lab Results   Component Value Date    WBC 5.2 04/19/2024    HGB 14.8 04/19/2024    HCT 44.4 04/19/2024     04/19/2024     Lab Results   Component Value Date    CHOL 214 04/19/2024     Lab Results   Component Value Date    HDL 44.0 04/19/2024     Lab Results   Component Value Date    LDLCALC 138 04/19/2024     Lab Results   Component Value Date    TRIG 183 04/19/2024     No components found for: \"CHOLHDL\"  Lab Results   Component Value Date    HGBA1C 5.9 04/19/2024     Other labs not included in the list above were reviewed either before or during this encounter.    History   Medical History[1]  Surgical History[2]  Family History[3]  Social History     Socioeconomic History    Marital status:      Spouse name: Not on file    Number of children: Not on file    Years of education: Not on file    Highest education level: Not on file   Occupational History    Not on file   Tobacco Use    Smoking status: Never     Passive exposure: Never    Smokeless tobacco: Never   Vaping Use    Vaping status: Never Used   Substance and Sexual Activity    Alcohol use: Yes     Alcohol/week: 1.0 - 2.0 standard drink of alcohol     Types: 1 - 2 Standard drinks or equivalent per week    Drug use: Never    Sexual activity: Not on file   Other Topics Concern    Not on file   Social History Narrative    Not on file     Social Drivers of Health     Financial Resource Strain: Not on file   Food Insecurity: Not on file   Transportation Needs: Not on file   Physical Activity: Not on file "   Stress: Not on file   Social Connections: Not on file   Intimate Partner Violence: Not on file   Housing Stability: Not on file     Allergies[4]  Medications Ordered Prior to Encounter[5]  Immunization History   Administered Date(s) Administered    Influenza, Unspecified 11/16/2006, 11/03/2009    Influenza, injectable, MDCK, quadrivalent 11/06/2017    Influenza, injectable, quadrivalent 11/01/2019    Novel Influenza-H1N1-09, all formulations 11/06/2009    Pfizer Purple Cap SARS-CoV-2 01/04/2021, 01/25/2021, 10/23/2021    Pneumococcal polysaccharide vaccine, 23-valent, age 2 years and older (PNEUMOVAX 23) 10/26/2010    Tdap vaccine, age 7 year and older (BOOSTRIX, ADACEL) 02/25/2010, 10/26/2010, 06/29/2020     Patient's medical history was reviewed and updated either before or during this encounter.       Sarah Lebron, APRN-CNP         [1]   Past Medical History:  Diagnosis Date    Allergic rhinitis 04/26/2024    Fatigue 04/26/2024    Gastroesophageal reflux disease without esophagitis 04/26/2024    Pain in limb 05/14/2009   [2] History reviewed. No pertinent surgical history.  [3]   Family History  Problem Relation Name Age of Onset    Breast cancer Father's Sister  45   [4]   Allergies  Allergen Reactions    Iodine Hives    Banana Unknown     lips tingle    Shellfish Derived Hives and Unknown   [5]   Current Outpatient Medications on File Prior to Visit   Medication Sig Dispense Refill    albuterol 90 mcg/actuation inhaler INHALE 2 PUFFS EVERY 4 HOURS IF NEEDED FOR WHEEZING OR SHORTNESS OF BREATH. 8.5 g 3    cholecalciferol (Vitamin D3) 25 MCG (1000 UT) tablet Take 4 tablets (4,000 Units) by mouth once daily.      citalopram (CeleXA) 40 mg tablet Take 1 tablet (40 mg) by mouth once daily. 90 tablet 3    diclofenac sodium (Voltaren Arthritis Pain) 1 % gel Apply 4.5 inches (4 g) topically if needed.      famotidine (Pepcid) 20 mg tablet Take 1 tablet (20 mg) by mouth if needed.      fluticasone (Flonase  Allergy Relief) 50 mcg/actuation nasal spray Administer 1 spray into each nostril if needed.      levocetirizine (Xyzal) 5 mg tablet once every 24 hours.      levonorgestrel (Mirena) 21 mcg/24 hours (8 yrs) 52 mg IUD Mirena      tirzepatide, weight loss, (Zepbound) 7.5 mg/0.5 mL injection Inject 7.5 mg under the skin every 7 days.      [DISCONTINUED] tirzepatide, weight loss, (Zepbound) 5 mg/0.5 mL solution Inject 5 mg under the skin every 7 days.      docusate sodium (Colace) 100 mg capsule Take 1 capsule (100 mg) by mouth once daily as needed for constipation.      [DISCONTINUED] citalopram (CeleXA) 20 mg tablet TAKE 1 TABLET BY MOUTH EVERY DAY (Patient not taking: Reported on 7/21/2025) 90 tablet 3     No current facility-administered medications on file prior to visit.

## 2025-07-23 ENCOUNTER — APPOINTMENT (OUTPATIENT)
Dept: RADIOLOGY | Facility: CLINIC | Age: 50
End: 2025-07-23
Payer: COMMERCIAL

## 2025-07-24 ENCOUNTER — APPOINTMENT (OUTPATIENT)
Dept: RADIOLOGY | Facility: CLINIC | Age: 50
End: 2025-07-24
Payer: COMMERCIAL

## 2025-07-30 ENCOUNTER — APPOINTMENT (OUTPATIENT)
Dept: RADIOLOGY | Facility: CLINIC | Age: 50
End: 2025-07-30
Payer: COMMERCIAL

## 2025-07-30 VITALS — BODY MASS INDEX: 36.36 KG/M2 | WEIGHT: 180 LBS

## 2025-07-30 DIAGNOSIS — Z12.31 ENCOUNTER FOR SCREENING MAMMOGRAM FOR BREAST CANCER: ICD-10-CM

## 2025-07-30 PROCEDURE — 77067 SCR MAMMO BI INCL CAD: CPT

## 2025-07-30 PROCEDURE — 77063 BREAST TOMOSYNTHESIS BI: CPT | Performed by: RADIOLOGY

## 2025-07-30 PROCEDURE — 77067 SCR MAMMO BI INCL CAD: CPT | Performed by: RADIOLOGY

## 2025-08-01 LAB
25(OH)D3+25(OH)D2 SERPL-MCNC: 37 NG/ML (ref 30–100)
ALBUMIN SERPL-MCNC: 4.2 G/DL (ref 3.6–5.1)
ALP SERPL-CCNC: 53 U/L (ref 31–125)
ALT SERPL-CCNC: 28 U/L (ref 6–29)
ANION GAP SERPL CALCULATED.4IONS-SCNC: 7 MMOL/L (CALC) (ref 7–17)
AST SERPL-CCNC: 27 U/L (ref 10–35)
BASOPHILS # BLD AUTO: 17 CELLS/UL (ref 0–200)
BASOPHILS NFR BLD AUTO: 0.3 %
BILIRUB SERPL-MCNC: 0.6 MG/DL (ref 0.2–1.2)
BUN SERPL-MCNC: 14 MG/DL (ref 7–25)
CALCIUM SERPL-MCNC: 9.5 MG/DL (ref 8.6–10.2)
CHLORIDE SERPL-SCNC: 100 MMOL/L (ref 98–110)
CHOLEST SERPL-MCNC: 202 MG/DL
CHOLEST/HDLC SERPL: 4.5 (CALC)
CO2 SERPL-SCNC: 27 MMOL/L (ref 20–32)
CREAT SERPL-MCNC: 0.58 MG/DL (ref 0.5–0.99)
EGFRCR SERPLBLD CKD-EPI 2021: 112 ML/MIN/1.73M2
EOSINOPHIL # BLD AUTO: 52 CELLS/UL (ref 15–500)
EOSINOPHIL NFR BLD AUTO: 0.9 %
ERYTHROCYTE [DISTWIDTH] IN BLOOD BY AUTOMATED COUNT: 12.8 % (ref 11–15)
GLUCOSE SERPL-MCNC: 102 MG/DL (ref 65–99)
HCT VFR BLD AUTO: 46.2 % (ref 35–45)
HDLC SERPL-MCNC: 45 MG/DL
HGB BLD-MCNC: 15.3 G/DL (ref 11.7–15.5)
LDLC SERPL CALC-MCNC: 132 MG/DL (CALC)
LYMPHOCYTES # BLD AUTO: 1682 CELLS/UL (ref 850–3900)
LYMPHOCYTES NFR BLD AUTO: 29 %
MCH RBC QN AUTO: 31.1 PG (ref 27–33)
MCHC RBC AUTO-ENTMCNC: 33.1 G/DL (ref 32–36)
MCV RBC AUTO: 93.9 FL (ref 80–100)
MONOCYTES # BLD AUTO: 493 CELLS/UL (ref 200–950)
MONOCYTES NFR BLD AUTO: 8.5 %
NEUTROPHILS # BLD AUTO: 3555 CELLS/UL (ref 1500–7800)
NEUTROPHILS NFR BLD AUTO: 61.3 %
NONHDLC SERPL-MCNC: 157 MG/DL (CALC)
PLATELET # BLD AUTO: 266 THOUSAND/UL (ref 140–400)
PMV BLD REES-ECKER: 9.6 FL (ref 7.5–12.5)
POTASSIUM SERPL-SCNC: 4.2 MMOL/L (ref 3.5–5.3)
PROT SERPL-MCNC: 6.9 G/DL (ref 6.1–8.1)
RBC # BLD AUTO: 4.92 MILLION/UL (ref 3.8–5.1)
SODIUM SERPL-SCNC: 134 MMOL/L (ref 135–146)
TRIGL SERPL-MCNC: 134 MG/DL
WBC # BLD AUTO: 5.8 THOUSAND/UL (ref 3.8–10.8)